# Patient Record
Sex: MALE | Race: WHITE | Employment: FULL TIME | URBAN - NONMETROPOLITAN AREA
[De-identification: names, ages, dates, MRNs, and addresses within clinical notes are randomized per-mention and may not be internally consistent; named-entity substitution may affect disease eponyms.]

---

## 2023-08-04 ENCOUNTER — OFFICE VISIT (OUTPATIENT)
Age: 28
End: 2023-08-04
Payer: MEDICARE

## 2023-08-04 VITALS
SYSTOLIC BLOOD PRESSURE: 110 MMHG | TEMPERATURE: 98.6 F | WEIGHT: 191 LBS | OXYGEN SATURATION: 98 % | HEART RATE: 64 BPM | RESPIRATION RATE: 20 BRPM | DIASTOLIC BLOOD PRESSURE: 68 MMHG

## 2023-08-04 DIAGNOSIS — H60.312 ACUTE DIFFUSE OTITIS EXTERNA OF LEFT EAR: Primary | ICD-10-CM

## 2023-08-04 PROCEDURE — 99213 OFFICE O/P EST LOW 20 MIN: CPT | Performed by: NURSE PRACTITIONER

## 2023-08-04 RX ORDER — CIPROFLOXACIN AND DEXAMETHASONE 3; 1 MG/ML; MG/ML
4 SUSPENSION/ DROPS AURICULAR (OTIC) 2 TIMES DAILY
Qty: 7.5 ML | Refills: 0 | Status: SHIPPED | OUTPATIENT
Start: 2023-08-04 | End: 2023-08-11

## 2023-08-04 ASSESSMENT — ENCOUNTER SYMPTOMS
RHINORRHEA: 1
EYES NEGATIVE: 1
SORE THROAT: 0
SINUS PRESSURE: 0
RESPIRATORY NEGATIVE: 1
COUGH: 0

## 2023-08-04 NOTE — PROGRESS NOTES
otitis externa of left ear            Plan:    No orders of the defined types were placed in this encounter. No follow-ups on file. No orders of the defined types were placed in this encounter. Orders Placed This Encounter   Medications    ciprofloxacin-dexamethasone (CIPRODEX) 0.3-0.1 % otic suspension     Sig: Place 4 drops into the left ear 2 times daily for 7 days     Dispense:  7.5 mL     Refill:  0       Patient given educationalmaterials - see patient instructions. Discussed use, benefit, and side effectsof prescribed medications. All patient questions answered. Pt voiced understanding. Reviewed health maintenance. Instructed to continue current medications, diet andexercise. Patient agreed with treatment plan. Follow up as directed. There are no Patient Instructions on file for this visit.       Electronically signed by CARMENZA Wu CNP on 8/4/2023 at 1:30 PM